# Patient Record
Sex: MALE | Race: WHITE
[De-identification: names, ages, dates, MRNs, and addresses within clinical notes are randomized per-mention and may not be internally consistent; named-entity substitution may affect disease eponyms.]

---

## 2017-11-15 ENCOUNTER — HOSPITAL ENCOUNTER (OUTPATIENT)
Dept: HOSPITAL 76 - LAB.F | Age: 57
Discharge: HOME | End: 2017-11-15
Attending: FAMILY MEDICINE
Payer: MEDICAID

## 2017-11-15 ENCOUNTER — HOSPITAL ENCOUNTER (OUTPATIENT)
Dept: HOSPITAL 76 - DI | Age: 57
Discharge: HOME | End: 2017-11-15
Attending: FAMILY MEDICINE
Payer: MEDICAID

## 2017-11-15 DIAGNOSIS — Z00.00: ICD-10-CM

## 2017-11-15 DIAGNOSIS — N50.9: ICD-10-CM

## 2017-11-15 DIAGNOSIS — N50.3: Primary | ICD-10-CM

## 2017-11-15 DIAGNOSIS — Z00.00: Primary | ICD-10-CM

## 2017-11-15 LAB
ALBUMIN/GLOB SERPL: 1.2 {RATIO} (ref 1–2.2)
ANION GAP SERPL CALCULATED.4IONS-SCNC: 11 MMOL/L (ref 6–13)
BASOPHILS NFR BLD AUTO: 0.1 10^3/UL (ref 0–0.1)
BASOPHILS NFR BLD AUTO: 0.9 %
BILIRUB BLD-MCNC: 1.7 MG/DL (ref 0.2–1)
BUN SERPL-MCNC: 16 MG/DL (ref 6–20)
CALCIUM UR-MCNC: 9.2 MG/DL (ref 8.5–10.3)
CHLORIDE SERPL-SCNC: 100 MMOL/L (ref 101–111)
CHOLEST SERPL-MCNC: 208 MG/DL
CO2 SERPL-SCNC: 25 MMOL/L (ref 21–32)
CREAT SERPLBLD-SCNC: 1.1 MG/DL (ref 0.6–1.2)
EOSINOPHIL # BLD AUTO: 0.3 10^3/UL (ref 0–0.7)
EOSINOPHIL NFR BLD AUTO: 3.2 %
ERYTHROCYTE [DISTWIDTH] IN BLOOD BY AUTOMATED COUNT: 13.4 % (ref 12–15)
GFRSERPLBLD MDRD-ARVRAT: 69 ML/MIN/{1.73_M2} (ref 89–?)
GLOBULIN SER-MCNC: 3.4 G/DL (ref 2.1–4.2)
GLUCOSE SERPL-MCNC: 103 MG/DL (ref 70–100)
HCT VFR BLD AUTO: 47.3 % (ref 42–52)
HDLC SERPL-MCNC: 43 MG/DL
HDLC SERPL: 4.8 {RATIO} (ref ?–5)
HGB UR QL STRIP: 16.3 G/DL (ref 14–18)
LDLC/HDLC SERPL: 3.4 {RATIO} (ref ?–3.6)
LYMPHOCYTES # SPEC AUTO: 2.1 10^3/UL (ref 1.5–3.5)
LYMPHOCYTES NFR BLD AUTO: 25.6 %
MCH RBC QN AUTO: 30.6 PG (ref 27–31)
MCHC RBC AUTO-ENTMCNC: 34.4 G/DL (ref 32–36)
MCV RBC AUTO: 88.9 FL (ref 80–94)
MONOCYTES # BLD AUTO: 1 10^3/UL (ref 0–1)
MONOCYTES NFR BLD AUTO: 11.7 %
NEUTROPHILS # BLD AUTO: 4.8 10^3/UL (ref 1.5–6.6)
NEUTROPHILS # SNV AUTO: 8.2 X10^3/UL (ref 4.8–10.8)
NEUTROPHILS NFR BLD AUTO: 58.6 %
NRBC # BLD AUTO: 0 /100WBC
PDW BLD AUTO: 7.6 FL (ref 7.4–11.4)
POTASSIUM SERPL-SCNC: 3.8 MMOL/L (ref 3.5–5)
PROT SPEC-MCNC: 7.6 G/DL (ref 6.7–8.2)
RBC MAR: 5.32 10^6/UL (ref 4.7–6.1)
SODIUM SERPLBLD-SCNC: 136 MMOL/L (ref 135–145)
TRIGL P FAST SERPL-MCNC: 97 MG/DL
VLDLC SERPL-SCNC: 19 MG/DL
WBC # BLD: 8.2 X10^3/UL

## 2017-11-15 PROCEDURE — 85025 COMPLETE CBC W/AUTO DIFF WBC: CPT

## 2017-11-15 PROCEDURE — 80053 COMPREHEN METABOLIC PANEL: CPT

## 2017-11-15 PROCEDURE — 80061 LIPID PANEL: CPT

## 2017-11-15 PROCEDURE — 84153 ASSAY OF PSA TOTAL: CPT

## 2017-11-15 PROCEDURE — 84443 ASSAY THYROID STIM HORMONE: CPT

## 2017-11-15 PROCEDURE — 36415 COLL VENOUS BLD VENIPUNCTURE: CPT

## 2017-11-15 PROCEDURE — 76870 US EXAM SCROTUM: CPT

## 2017-11-15 NOTE — ULTRASOUND REPORT
SCROTAL DUPLEX: 11/15/2017

 

CLINICAL INDICATION: Palpable abnormality left side. 

 

TECHNIQUE: Real-time sonographic vascular imaging was performed by the sonographer through the scrotu
m utilizing both color-flow and Doppler spectral analysis.  Multiple representative static images wer
e saved for review.

 

FINDINGS:  The right testicle measures 5.2 x 3.2 x 2.6 cm, and the left testicle measures 4.9 x 3.5 x
 2.3 cm. Both testicles demonstrate normal flow and echotexture. Trace hydroceles are present. The ri
ght epididymis is unremarkable. The left epididymis contains a 2.5 x 2.1 x 1.7 cm cyst, likely accoun
ting for the palpable abnormality. No hernia or varicocele is seen. 

 

IMPRESSION:  A 2.5 CM LEFT EPIDIDYMAL CYST. NORMAL TESTES.

 

 

 

DD:11/15/2017 13:10:53  DT: 11/15/2017 13:29  JOB #: [id number]  EXT JOB #:[id number]

## 2019-01-28 ENCOUNTER — HOSPITAL ENCOUNTER (EMERGENCY)
Dept: HOSPITAL 76 - ED | Age: 59
Discharge: HOME | End: 2019-01-28
Payer: MEDICAID

## 2019-01-28 VITALS — SYSTOLIC BLOOD PRESSURE: 122 MMHG | DIASTOLIC BLOOD PRESSURE: 84 MMHG

## 2019-01-28 DIAGNOSIS — K04.7: Primary | ICD-10-CM

## 2019-01-28 DIAGNOSIS — F17.200: ICD-10-CM

## 2019-01-28 PROCEDURE — 99283 EMERGENCY DEPT VISIT LOW MDM: CPT

## 2019-01-28 NOTE — ED PHYSICIAN DOCUMENTATION
PD HPI HEENT





- Stated complaint


Stated Complaint: TOOTH PX





- Chief complaint


Chief Complaint: Heent





- History obtained from


History obtained from: Patient





- History of Present Illness


Timing - onset: How many days ago (3)


Timing - duration: Days (3)


Timing - details: Still present


Location: Tooth


Associated symptoms: Facial swelling


Similar symptoms before: Has not had sx before





- Additional information


Additional information: 


The patient is a 58-year-old male who presents with left upper molar toothache 

that has been getting progressively worse over the past 3 days.  Today he 

developed left facial swelling.  He denies fever, difficulty swallowing, or 

visual disturbance.  He does report mild headache.  He denies history of similar

symptoms in the past.








Review of Systems


Constitutional: denies: Fever


Eyes: denies: Irritation


Ears: reports: Ear pain (Right ear, not left.)


Nose: denies: Congestion


Throat: reports: Dental pain / toothache (Left upper molar.).  denies: Sore 

throat


Cardiac: denies: Chest pain / pressure


Respiratory: denies: Dyspnea, Cough


GI: denies: Abdominal Pain, Nausea, Vomiting


Skin: denies: Rash


Musculoskeletal: denies: Neck pain


Neurologic: reports: Headache (mild)





PD PAST MEDICAL HISTORY





- Past Medical History


Past Medical History: No


Cardiovascular: None


Respiratory: None


Neuro: None


Endocrine/Autoimmune: None


GI: None


: None


HEENT: None


Psych: None


Musculoskeletal: None


Derm: None





- Past Surgical History


Past Surgical History: Yes





- Present Medications


Home Medications: 


                                Ambulatory Orders











 Medication  Instructions  Recorded  Confirmed


 


Hydrocodone/Acetaminophen 1 - 2 each PO Q6H PRN #14 tablet 01/28/19 





[Hydrocodon-Acetaminophen 5-325]   


 


cephALEXin [Cephalexin] 500 mg PO TID #30 tablet 01/28/19 














- Allergies


Allergies/Adverse Reactions: 


                                    Allergies











Allergy/AdvReac Type Severity Reaction Status Date / Time


 


No Known Drug Allergies Allergy   Verified 01/28/19 12:35














- Social History


Does the pt smoke?: Yes


Smoking Status: Current every day smoker


Does the pt drink ETOH?: Yes


ETOH Use: Beer


Does the pt have substance abuse?: No





- Immunizations


Immunizations are current?: No





- POLST


Patient has POLST: No





PD ED PE NORMAL





- Vitals


Vital signs reviewed: Yes (normal)





- General


General: Alert and oriented X 3, Well developed/nourished





- HEENT


HEENT: Atraumatic, PERRL, EOMI, Ears normal, Pharynx benign, Other (There is a 

decayed left upper molar which is tender to palpation.  The 2 adjacent teeth are

 not tender to palpation. There is left facial swelling in the left maxillary 

region. There is dental decay noted in other teeth as well.)





- Neck


Neck: Supple, no meningeal sign, No adenopathy





- Cardiac


Cardiac: RRR, No murmur





- Respiratory


Respiratory: No respiratory distress, Clear bilaterally





- Derm


Derm: No rash





- Neuro


Neuro: Alert and oriented X 3, No motor deficit, Normal speech





Results





- Vitals


Vitals: 


                                     Oxygen











O2 Source                      Room air

















PD MEDICAL DECISION MAKING





- ED course


Complexity details: considered differential, d/w patient, d/w family


ED course: 


The patient's presentation is significant for dental abscess with left facial 

swelling.  There is no ocular involvement.


Treatment in the emergency department included administration of cephalexin 500 

mg orally and Vicodin 1 tablet orally.  He is being discharged with 

prescriptions for cephalexin and for Vicodin, 14 tablets.  I discussed with him 

and his wife antibiotic treatment and the importance of outpatient follow-up 

with a dentist, as well as potentially worrisome signs or symptoms that should 

prompt reevaluation in the emergency department.








Departure





- Departure


Disposition: 01 Home, Self Care


Clinical Impression: 


 Dental abscess





Condition: Stable


Instructions:  ED Abscess Dental


Follow-Up: 


Matt Adams MD [Credentialed Staff Provider] - 


Prescriptions: 


cephALEXin [Cephalexin] 500 mg PO TID #30 tablet


Hydrocodone/Acetaminophen [Hydrocodon-Acetaminophen 5-325] 1 - 2 each PO Q6H PRN

#14 tablet


 PRN Reason: pain


Comments: 


Take cephalexin 3 times daily as prescribed.


You can use ibuprofen, up to 800 mg 3 times daily for its anti-inflammatory 

effect.


You can also use Vicodin as prescribed if needed for pain.


Follow-up with a dentist as soon as possible.  Call to schedule an appointment.


Return to the emergency department if you develop increasing facial swelling, or

otherwise worsening symptoms.


Discharge Date/Time: 01/28/19 15:03

## 2019-11-26 ENCOUNTER — HOSPITAL ENCOUNTER (OUTPATIENT)
Dept: HOSPITAL 76 - DI | Age: 59
Discharge: HOME | End: 2019-11-26
Attending: PODIATRIST
Payer: MEDICAID

## 2019-11-26 DIAGNOSIS — M79.672: ICD-10-CM

## 2019-11-26 DIAGNOSIS — M79.671: Primary | ICD-10-CM

## 2019-11-27 NOTE — XRAY REPORT
Reason:  PAIN IN BALLS OF BOTH FEET

Procedure Date:  11/26/2019   

Accession Number:  [id number] / [id number]                    

Procedure:  XR  - Foot 3 View BILAT CPT Code:  

 

***Final Report***

 

 

FULL RESULT:

 

 

EXAMS:

1. Right Foot Radiography

2. Left Foot Radiography

 

EXAM DATE: 11/26/2019 04:16 PM.

 

CLINICAL HISTORY: Pain in balls of both feet.

 

COMPARISON: None.

 

TECHNIQUE: 3 views each foot.

 

FINDINGS:

 

LEFT

Bones: Normal. No fractures or bone lesions.

 

Joints: Normal. No subluxations.

 

Soft Tissues: Small tendinous calcification versus unfused osteophyte at 

the medial base of the fifth distal phalanx. The sesamoids plantar to the 

first metatarsal head appear unremarkable.

 

RIGHT

Bones: Transverse lucency through the mid body of the medial sesamoid 

plantar to the first metatarsal head, well sclerosed margins. This is 

compatible with a multipart sesamoid, with a previous fracture not 

totally excludable. This is asymmetric with imaging of the right foot. 

The first metatarsal head appears unremarkable.

 

Joints: Normal. No subluxations.

 

Soft Tissues: Normal. No soft tissue swelling.

IMPRESSION: Transverse lucency through the medial sesamoid plantar to the 

first metatarsal head with well sclerosed margins. This is asymmetric 

with the left foot. Differential considerations would include a multipart 

sesamoid versus less likely remote trauma.

 

[facility]